# Patient Record
Sex: MALE
[De-identification: names, ages, dates, MRNs, and addresses within clinical notes are randomized per-mention and may not be internally consistent; named-entity substitution may affect disease eponyms.]

---

## 2019-12-20 ENCOUNTER — APPOINTMENT (OUTPATIENT)
Dept: ORTHOPEDIC SURGERY | Facility: CLINIC | Age: 29
End: 2019-12-20
Payer: COMMERCIAL

## 2019-12-20 VITALS
HEART RATE: 52 BPM | SYSTOLIC BLOOD PRESSURE: 133 MMHG | HEIGHT: 74 IN | WEIGHT: 255 LBS | DIASTOLIC BLOOD PRESSURE: 76 MMHG | BODY MASS INDEX: 32.73 KG/M2

## 2019-12-20 DIAGNOSIS — S83.511A SPRAIN OF ANTERIOR CRUCIATE LIGAMENT OF RIGHT KNEE, INITIAL ENCOUNTER: ICD-10-CM

## 2019-12-20 DIAGNOSIS — Z78.9 OTHER SPECIFIED HEALTH STATUS: ICD-10-CM

## 2019-12-20 DIAGNOSIS — Z80.9 FAMILY HISTORY OF MALIGNANT NEOPLASM, UNSPECIFIED: ICD-10-CM

## 2019-12-20 PROBLEM — Z00.00 ENCOUNTER FOR PREVENTIVE HEALTH EXAMINATION: Status: ACTIVE | Noted: 2019-12-20

## 2019-12-20 PROCEDURE — 99203 OFFICE O/P NEW LOW 30 MIN: CPT

## 2019-12-20 PROCEDURE — 73564 X-RAY EXAM KNEE 4 OR MORE: CPT | Mod: RT

## 2019-12-26 PROBLEM — Z78.9 ALCOHOL INGESTION: Status: ACTIVE | Noted: 2019-12-20

## 2019-12-26 PROBLEM — S83.511A RUPTURE OF ANTERIOR CRUCIATE LIGAMENT OF RIGHT KNEE, INITIAL ENCOUNTER: Status: ACTIVE | Noted: 2019-12-20

## 2019-12-26 PROBLEM — Z78.9 EXERCISES RARELY: Status: ACTIVE | Noted: 2019-12-20

## 2019-12-26 PROBLEM — Z80.9 FAMILY HISTORY OF MALIGNANT NEOPLASM: Status: ACTIVE | Noted: 2019-12-20

## 2019-12-26 PROBLEM — Z78.9 NON-SMOKER: Status: ACTIVE | Noted: 2019-12-20

## 2019-12-26 NOTE — HISTORY OF PRESENT ILLNESS
[de-identified] : 28 y/o male who works with [employer] with history of Right knee ACLR with Hamstring Autograft on 5/9/2019 by Dr. Kiko Fair presents with right knee pain and swelling on 11/20/2019. He denies any injury or trauma. He states that he was running on the treadmill and the knee became painful and swollen. He had an MRI done and is here today for consulation.\par \par He states the pain is a 5/10, which he does not take any pain meds. He denies any numbness or tingling.

## 2019-12-26 NOTE — PHYSICAL EXAM
[de-identified] : 28 y/o pleasant  male in NAD and 32.7 BMI. The pt has a mildly antalgic gait. Physical examination of both knees reveals normal contours, skin intact with no signs of infection, no erythema, no swelling, no effusion, no distal lymphedema or phlebitis, no patholaxity. ROM of the right knee reveals 0°-138° Strength is 5/5 within this arc of motion. There is pain on palpation to the medial joint line. There are no neurological deficits. Quad circumference measured 10cm above superior pole of patella 53cm on right and 55 cm on left. Anterior drawer on right negative, negative aply on right, negative pivot on right. Some decreased sensation over the medial shin consistent with saphenous distribution.  [de-identified] : X-rays were ordered, obtained, and interpreted by me today: 4 views of the right knee demonstrate no changes in bony morphology, with no acute fracture or dislocation.\par \par MRI of the right knee performed at [facility] on 12/12/2019 shows high grade partial ACL graft injury and postero-medial meniscus injury

## 2019-12-26 NOTE — DISCUSSION/SUMMARY
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   A lengthy discussion was held regarding the patients condition and treatment options including all risks, benefits, prognosis and outcomes of each were discussed in detail.We will send him for KT 1000 testing today to fully evaluate any patholaxity in the knee.  Follow up will be after the KT 1000 testing and with his original operating surgeon, Dr. Fair. The patient expressed understanding and all questions were answered. It should be considered that if at the time of EUA and based upon [id number] results and the arthroscopic look, if the hamstring autograft integrity is compromised than revision may be considered.

## 2019-12-26 NOTE — CONSULT LETTER
[Dear  ___] : Dear  [unfilled], [FreeTextEntry1] : I had the pleasure of evaluating your patient in the office today for complaints of right knee  pain secondary to meniscus tear and possible ACL graft re-tear. I have enclosed a copy of today's office notes for your charts and for your review.\par \par Sincerely, \par \par Martinez Velazquez M.D.\par Professor and \par Department of Orthopedic Surgery\par [facility] [place]"

## 2019-12-26 NOTE — REVIEW OF SYSTEMS
[Joint Pain] : joint pain [Joint Stiffness] : joint stiffness [Joint Swelling] : joint swelling [Anxiety] : anxiety [Muscle Weakness] : muscle weakness [Negative] : Heme/Lymph

## 2025-04-21 ENCOUNTER — APPOINTMENT (OUTPATIENT)
Dept: HEART AND VASCULAR | Facility: CLINIC | Age: 35
End: 2025-04-21